# Patient Record
Sex: MALE | Race: WHITE | NOT HISPANIC OR LATINO | Employment: FULL TIME | URBAN - METROPOLITAN AREA
[De-identification: names, ages, dates, MRNs, and addresses within clinical notes are randomized per-mention and may not be internally consistent; named-entity substitution may affect disease eponyms.]

---
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
## 2019-11-25 LAB — RAPID GROUP A STREP (OHS): NEGATIVE

## 2020-09-25 ENCOUNTER — HISTORICAL (OUTPATIENT)
Dept: ADMINISTRATIVE | Facility: HOSPITAL | Age: 20
End: 2020-09-25

## 2020-09-25 LAB
ABS NEUT (OLG): 3.4 X10(3)/MCL (ref 2.1–9.2)
ALBUMIN SERPL-MCNC: 4.4 GM/DL (ref 3.4–5)
ALBUMIN/GLOB SERPL: 1.83 {RATIO} (ref 1.5–2.5)
ALP SERPL-CCNC: 59 UNIT/L (ref 38–126)
ALT SERPL-CCNC: 22 UNIT/L (ref 7–52)
APPEARANCE, UA: CLEAR
AST SERPL-CCNC: 19 UNIT/L (ref 15–37)
BACTERIA #/AREA URNS AUTO: ABNORMAL /HPF
BILIRUB SERPL-MCNC: 0.4 MG/DL (ref 0.2–1)
BILIRUB UR QL STRIP: NEGATIVE MG/DL
BILIRUBIN DIRECT+TOT PNL SERPL-MCNC: 0.1 MG/DL (ref 0–0.5)
BILIRUBIN DIRECT+TOT PNL SERPL-MCNC: 0.3 MG/DL
BUN SERPL-MCNC: 15 MG/DL (ref 7–18)
CALCIUM SERPL-MCNC: 9.7 MG/DL (ref 8.5–10.1)
CHLORIDE SERPL-SCNC: 104 MMOL/L (ref 98–107)
CHOLEST SERPL-MCNC: 163 MG/DL (ref 0–200)
CHOLEST/HDLC SERPL: 3.7 {RATIO}
CO2 SERPL-SCNC: 32 MMOL/L (ref 21–32)
COLOR UR: YELLOW
CREAT SERPL-MCNC: 0.95 MG/DL (ref 0.6–1.3)
ERYTHROCYTE [DISTWIDTH] IN BLOOD BY AUTOMATED COUNT: 12.2 % (ref 11.5–17)
GLOBULIN SER-MCNC: 2.4 GM/DL (ref 1.2–3)
GLUCOSE (UA): NEGATIVE MG/DL
GLUCOSE SERPL-MCNC: 87 MG/DL (ref 74–106)
HCT VFR BLD AUTO: 47.1 % (ref 42–52)
HDLC SERPL-MCNC: 44 MG/DL (ref 35–60)
HGB BLD-MCNC: 15.7 GM/DL (ref 14–18)
HGB UR QL STRIP: NEGATIVE UNIT/L
KETONES UR QL STRIP: NEGATIVE MG/DL
LDLC SERPL CALC-MCNC: 115 MG/DL (ref 0–129)
LEUKOCYTE ESTERASE UR QL STRIP: NEGATIVE UNIT/L
LYMPHOCYTES # BLD AUTO: 2 X10(3)/MCL (ref 0.6–3.4)
LYMPHOCYTES NFR BLD AUTO: 31.3 % (ref 13–40)
MCH RBC QN AUTO: 29.3 PG (ref 27–31.2)
MCHC RBC AUTO-ENTMCNC: 33 GM/DL (ref 32–36)
MCV RBC AUTO: 88 FL (ref 80–94)
MONOCYTES # BLD AUTO: 0.9 X10(3)/MCL (ref 0.1–1.3)
MONOCYTES NFR BLD AUTO: 14.1 % (ref 0.1–24)
NEUTROPHILS NFR BLD AUTO: 54.6 % (ref 47–80)
NITRITE UR QL STRIP.AUTO: NEGATIVE
PH UR STRIP: 6 [PH]
PLATELET # BLD AUTO: 216 X10(3)/MCL (ref 130–400)
PMV BLD AUTO: 9.3 FL (ref 9.4–12.4)
POTASSIUM SERPL-SCNC: 4.2 MMOL/L (ref 3.5–5.1)
PROT SERPL-MCNC: 6.8 GM/DL (ref 6.4–8.2)
PROT UR QL STRIP: NEGATIVE MG/DL
RBC # BLD AUTO: 5.35 X10(6)/MCL (ref 4.7–6.1)
RBC #/AREA URNS HPF: ABNORMAL /HPF
SODIUM SERPL-SCNC: 141 MMOL/L (ref 136–145)
SP GR UR STRIP: >1.03
SQUAMOUS EPITHELIAL, UA: ABNORMAL /LPF
TRIGL SERPL-MCNC: 61 MG/DL (ref 30–150)
UROBILINOGEN UR STRIP-ACNC: 0.2 MG/DL
VLDLC SERPL CALC-MCNC: 12.2 MG/DL
WBC # SPEC AUTO: 6.3 X10(3)/MCL (ref 4.5–11.5)
WBC #/AREA URNS AUTO: ABNORMAL /[HPF]

## 2020-11-12 LAB — RAPID GROUP A STREP (OHS): NEGATIVE

## 2021-02-02 ENCOUNTER — HISTORICAL (OUTPATIENT)
Dept: ADMINISTRATIVE | Facility: HOSPITAL | Age: 21
End: 2021-02-02

## 2021-02-02 LAB — SARS-COV-2 RNA RESP QL NAA+PROBE: NEGATIVE

## 2021-02-04 LAB — FINAL CULTURE: NORMAL

## 2022-04-10 ENCOUNTER — HISTORICAL (OUTPATIENT)
Dept: ADMINISTRATIVE | Facility: HOSPITAL | Age: 22
End: 2022-04-10

## 2022-04-26 VITALS
DIASTOLIC BLOOD PRESSURE: 60 MMHG | OXYGEN SATURATION: 97 % | BODY MASS INDEX: 23.93 KG/M2 | WEIGHT: 157.88 LBS | SYSTOLIC BLOOD PRESSURE: 100 MMHG | HEIGHT: 68 IN

## 2022-05-03 NOTE — HISTORICAL [facility]
This is a historical note converted from Meghan. Formatting and pictures may have been removed.  Please reference Meghan for original formatting and attached multimedia. Chief Complaint  ANNUAL WELLNESS-NON-FASTING  History of Present Illness  Patient presents for wellness examination.  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  He is sleeping a lot better.  Appetite has been okay.  He fences at times. He is otherwise inactive.  No tobacco or alcohol.  He has an occasional soda or cup of coffee.  ?  Review of Systems  Constitutional:??no?weight gain,??no?weight loss,??no?fatigue, ?no?fever, ?no?chills, ?no?weakness, ?no?trouble sleeping  Eyes: ?no?vision loss/changes,??+?glasses,??no?pain,??no?redness,??no?blurry or double vision,??no?flashing lights,??no?glaucoma,??no?cataracts  Last eye exam:?has been a while  Neck: ?no?lymphadenopathy,??no?thyroid abnormalities,??no?bruits,??no?stiffness  Ears:??no?decreased hearing,??no?tinnitus,??no?earache,??no?drainage?  Nose:??no?congestion,??no?rhinorrhea,??no?epistaxis,??no?sinus pressure  Throat/Oral:??no?sore throat,??no?hoarseness, ?no?dental caries,??no?gum bleeding,??no?oral lesions  Cardiovascular:??no?chest pain, palpitations, or tightness,??+?dyspnea with exertion: deconditioning,??no?orthopnea,??no?paroxysmal nocturnal dyspnea  Respiratory:??no?cough,??no?sputum,??no?hemoptysis,??no?dyspnea,??no?wheezing,??no?pleuritic chest pain?  Gastrointestinal:??no?abdominal pain,??no?nausea,??no?vomiting,??+?heartburn: infrequent,??no?dysphagia or odynophagia,??no?diarrhea,??no?constipation,??no?melena,??no?hematochezia,?no?jaundice  Urinary:??no?frequency,??no?urgency,??no?burning or pain,?no?hematuria,??no?incontinence,??no?hesitancy,??no?incomplete voiding,??no?flank pain,??no?nocturia  Musculoskeletal:?no?myalgias,??+?arthralgias: left knee, due to being on his feet a lot,?no?neck pain,??no?back pain,??no?swelling of  extremities  Skin:?no?rashes,??no?sores,??no?non-healing wounds  Neurologic:??+?headaches: occasional migraines,?takes Excedrin which usually helps, ?no?dizziness/lightheadedness,??no?tremors,??no?paresthesias,??no?seizures,??no?muscle weakness  Psychiatric:??+?depression/sadness,??no?anhedonia,??no?irritability,??no?suicidal ideations,??no?anxiety,??no?panic attacks  Endocrine:??no?hot or cold intolerance,??no?sweating,??no?polyuria,??no?polydipsia,??no?polyphagia  Hematologic:??no?bruising,??no?bleeding disorders?  Physical Exam  Vitals & Measurements  T:?36.9? ?C (Oral)? HR:?80(Peripheral)? BP:?106/72? SpO2:?97%?  HT:?172.72?cm? HT:?172.72?cm? WT:?72.900?kg? WT:?72.9?kg? BMI:?24.44?  ?  GENERAL: The patient is a well-developed, well-nourished young white?male in no?apparent distress. He is alert and oriented x 4.  HEENT: Head is normocephalic and atraumatic. Extraocular muscles are intact. Pupils are equal, round, and reactive to light and accommodation. Nares appeared normal. Mouth is well hydrated and without lesions. Mucous membranes are moist. Posterior pharynx clear of any exudate or lesions.  NECK: Supple. No carotid bruits.? No lymphadenopathy or thyromegaly.  LUNGS: Clear to auscultation.  HEART: Regular rate and rhythm without murmur, gallops or rubs.  ABDOMEN: Soft, nontender, and nondistended.? Positive bowel sounds.? No hepatosplenomegaly was noted.  EXTREMITIES: Without any cyanosis, clubbing, rash, lesions or edema.? Left knee:?No tenderness to palpation, no swelling,?good flexion and extension.  NEUROLOGIC: Cranial nerves II through XII are grossly intact.? No motor or sensory deficits.? Cerebellar function intact.  SKIN: No ulceration or induration present.  :? Normal male genitalia, no hernias,??NST,??prostate soft, smooth and without nodules.  ?  Assessment/Plan  1.?Wellness examination?Z00.00  Patient presents for wellness examination.  Quadrivalent flu vaccine 0.5 IM administered.  Patient  encouraged to be more physically active and exercise.  Labs pending.  Ordered:  CBC w/ Auto Diff, Routine collect, 09/25/20 9:04:00 CDT, Blood, Order for future visit, Stop date 09/25/20 9:04:00 CDT, Lab Collect, Wellness examination, 09/25/20 9:04:00 CDT  Clinic Follow-Up Wellness, *Est. 09/25/21 3:00:00 CDT, Order for future visit, Wellness examination, [facility]  Comprehensive Metabolic Panel, Routine collect, 09/25/20 9:04:00 CDT, Blood, Order for future visit, Stop date 09/25/20 9:04:00 CDT, Lab Collect, Wellness examination, 09/25/20 9:04:00 CDT  Lab Collection Request, 09/25/20 9:04:00 CDT, [facility] - [facility], 09/25/20 9:04:00 CDT, Wellness examination  Lipid Panel, Routine collect, 09/25/20 9:04:00 CDT, Blood, Order for future visit, Stop date 09/25/20 9:04:00 CDT, Lab Collect, Wellness examination, 09/25/20 9:04:00 CDT  Preventative Health Care Est 18-39 years [id number] PC, Wellness examination  Depression  Immunization due, [facility] - [facility], 09/25/20 9:05:00 CDT  Urinalysis no Reflex, Routine collect, Urine, Order for future visit, 09/25/20 9:04:00 CDT, Stop date 09/25/20 9:04:00 CDT, Nurse collect, Wellness examination  ?  2.?Depression?F32.9  Has been well controlled.  Will discontinue citalopram due to sexual side effects.  Trial of Wellbutrin  mg: Take 1?daily.? He is to let us know how he is doing in 3 to 4 weeks.  Ordered:  Preventative Health Care Est 18-39 years [id number] PC, Wellness examination  Depression  Immunization due, [facility] - [facility], 09/25/20 9:05:00 CDT  ?  3.?Immunization due?Z23  Quadrivalent flu vaccine 0.5 IM administered; benefits/potential risks/side effects discussed with patient.  Ordered:  Influenza Virus Vaccine, Inactivated, 0.5 mL, IM, Once-Unscheduled, first dose 09/25/20 8:59:00 CDT  Preventative Health Care Est 18-39 years [id number] PC, Wellness examination  Depression  Immunization due, [facility] - [facility], 09/25/20 9:05:00 CDT  ?  Orders:  buPROPion, 150 mg = 1 tab(s), Oral,  q24hr, # 30 tab(s), 1 Refill(s), Pharmacy: [facility]/pharmacy #[id number], 172.72, cm, Height/Length Dosing, 09/25/20 8:38:00 CDT, 72.9, kg, Weight Dosing, 09/25/20 8:38:00 CDT  Referrals  Clinic Follow-Up Wellness, *Est. 09/25/21 3:00:00 CDT, Order for future visit, Wellness examination, [facility]   Problem List/Past Medical History  Ongoing  Depression  Insomnia  Historical  Low back strain  Needs flu shot  Procedure/Surgical History  [place] teeth extracted (2017)  nasal fx repair (2005)   Medications  Adult Quadrivalent Influenza Vaccine, 0.5 mL, IM, Once-Unscheduled  Wellbutrin  mg/24 hours oral tablet, extended release, 150 mg= 1 tab(s), Oral, q24hr, 1 refills  Allergies  penicillins?(Rash)  Social History  Abuse/Neglect  No, 09/25/2020  No, 08/14/2020  Alcohol  Never, 09/25/2020  Never, 08/01/2018  Employment/School  Employed, Work/School description: [employer]., 09/25/2020  Unemployed, 08/01/2018  Exercise  Home/Environment  Lives with Father, Mother. Living situation: Home/Independent., 09/25/2020  Living situation: Home/Independent., 08/01/2018  Nutrition/Health  Regular, Good, 09/25/2020  Regular, Caffeine intake amount: 1 SODA PER DAY., 08/01/2018  Substance Use  Never, 09/25/2020  Never, 08/01/2018  Tobacco  Never (less than 100 in lifetime), N/A, 09/25/2020  Never (less than 100 in lifetime), No, 08/14/2020  Family History  Asthma.: Father.  Cardiovascular disease: Father.  Depression: Brother.  Diabetes mellitus type 2: Mother.  Hypertension.: Father.  Impaired glucose tolerance: Father.  Immunizations  Vaccine Date Status   influenza virus vaccine, inactivated 11/21/2018 Given   varicella virus vaccine 07/20/2011 Recorded   tetanus/diphtheria/pertussis, acel(Tdap) 07/20/2011 Recorded   meningococcal conjugate vaccine 07/20/2011 Recorded   poliovirus vaccine, inactivated 01/31/2005 Recorded   measles/mumps/rubella virus vaccine 01/31/2005 Recorded   diphtheria/pertussis, acel/tetanus ped 01/31/2005  Recorded   varicella virus vaccine 04/25/2001 Recorded   diphtheria/pertussis, acel/tetanus ped 04/25/2001 Recorded   measles/mumps/rubella virus vaccine 12/13/2000 Recorded   poliovirus vaccine, inactivated 10/13/2000 Recorded   hepatitis B pediatric vaccine 10/13/2000 Recorded   diphtheria/pertussis, acel/tetanus ped 04/15/2000 Recorded   diphtheria/pertussis, acel/tetanus ped 02/14/2000 Recorded   poliovirus vaccine, inactivated 02/11/2000 Recorded   hepatitis B pediatric vaccine 02/11/2000 Recorded   poliovirus vaccine, inactivated 1999 Recorded   diphtheria/pertussis, acel/tetanus ped 1999 Recorded   hepatitis B pediatric vaccine 1999 Recorded   Health Maintenance  Health Maintenance  ???Pending?(in the next year)  ??? ??OverDue  ??? ? ? ?Alcohol Misuse Screening due??01/02/20??and every 1??year(s)  ??? ??Due?  ??? ? ? ?ADL Screening due??09/25/20??and every 1??year(s)  ??? ? ? ?Influenza Vaccine due??09/25/20??and every?  ??? ??Due In Future?  ??? ? ? ?Obesity Screening not due until??01/01/21??and every 1??year(s)  ??? ? ? ?Tetanus Vaccine not due until??07/20/21??and every 10??year(s)  ???Satisfied?(in the past 1 year)  ??? ??Satisfied?  ??? ? ? ?Blood Pressure Screening on??09/25/20.??Satisfied by Sherif Light LPN  ??? ? ? ?Body Mass Index Check on??09/25/20.??Satisfied by Sherif Light LPN  ??? ? ? ?Influenza Vaccine on??09/25/20.??Satisfied by Han Camargo MD  ??? ? ? ?Obesity Screening on??09/25/20.??Satisfied by Sherif Light LPN  ?

## 2022-06-14 PROBLEM — Z13.31 POSITIVE DEPRESSION SCREENING: Status: ACTIVE | Noted: 2022-06-14

## 2022-09-21 ENCOUNTER — HISTORICAL (OUTPATIENT)
Dept: ADMINISTRATIVE | Facility: HOSPITAL | Age: 22
End: 2022-09-21

## 2022-10-04 ENCOUNTER — OFFICE VISIT (OUTPATIENT)
Dept: URGENT CARE | Facility: CLINIC | Age: 22
End: 2022-10-04
Payer: COMMERCIAL

## 2022-10-04 VITALS
OXYGEN SATURATION: 100 % | HEIGHT: 68 IN | SYSTOLIC BLOOD PRESSURE: 167 MMHG | BODY MASS INDEX: 22.99 KG/M2 | TEMPERATURE: 99 F | RESPIRATION RATE: 18 BRPM | DIASTOLIC BLOOD PRESSURE: 115 MMHG | WEIGHT: 151.69 LBS | HEART RATE: 62 BPM

## 2022-10-04 DIAGNOSIS — R10.30 LOWER ABDOMINAL PAIN: Primary | ICD-10-CM

## 2022-10-04 DIAGNOSIS — K59.00 CONSTIPATION, UNSPECIFIED CONSTIPATION TYPE: ICD-10-CM

## 2022-10-04 LAB
BILIRUB UR QL STRIP: NEGATIVE
GLUCOSE UR QL STRIP: NEGATIVE
KETONES UR QL STRIP: POSITIVE
LEUKOCYTE ESTERASE UR QL STRIP: NEGATIVE
PH, POC UA: 6
POC BLOOD, URINE: NEGATIVE
POC NITRATES, URINE: NEGATIVE
PROT UR QL STRIP: NEGATIVE
SP GR UR STRIP: 1.03 (ref 1–1.03)
UROBILINOGEN UR STRIP-ACNC: 0.2 (ref 0.3–2.2)

## 2022-10-04 PROCEDURE — 99214 OFFICE O/P EST MOD 30 MIN: CPT | Mod: S$PBB,,, | Performed by: FAMILY MEDICINE

## 2022-10-04 PROCEDURE — 81003 URINALYSIS AUTO W/O SCOPE: CPT | Mod: PBBFAC | Performed by: FAMILY MEDICINE

## 2022-10-04 PROCEDURE — 99214 PR OFFICE/OUTPT VISIT, EST, LEVL IV, 30-39 MIN: ICD-10-PCS | Mod: S$PBB,,, | Performed by: FAMILY MEDICINE

## 2022-10-04 PROCEDURE — 99214 OFFICE O/P EST MOD 30 MIN: CPT | Mod: PBBFAC | Performed by: FAMILY MEDICINE

## 2022-10-04 RX ORDER — POLYETHYLENE GLYCOL 3350 17 G/17G
17 POWDER, FOR SOLUTION ORAL DAILY
Qty: 235 G | Refills: 2 | Status: SHIPPED | OUTPATIENT
Start: 2022-10-04 | End: 2022-10-27

## 2022-10-04 RX ORDER — SYRING-NEEDL,DISP,INSUL,0.3 ML 29 G X1/2"
296 SYRINGE, EMPTY DISPOSABLE MISCELLANEOUS ONCE
Qty: 295 ML | Refills: 2 | Status: SHIPPED | OUTPATIENT
Start: 2022-10-04 | End: 2022-10-04

## 2022-10-04 NOTE — LETTER
October 4, 2022      [facility]  [address] [address]  Phone: [phone]       Patient: Richie Fernández   YOB: 2000  Date of Visit: 10/04/2022    To Whom It May Concern:    Leonides Fernández  was at [facility] on 10/04/2022. The patient may return to work/school on OCT 7 2022 with no restrictions. If you have any questions or concerns, or if I can be of further assistance, please do not hesitate to contact me.    Sincerely,    BECKY HILL MD

## 2022-10-04 NOTE — PROGRESS NOTES
"Subjective:       Patient ID: Richie Fernández is a 21 y.o. male.    Chief Complaint: Abdominal Pain (Lower x today) and Constipation      HPI  22 yo male with abdominal pain and constipation.  Pain started in his lower abdomen today.  Stool floats and has a "mossy" appearance.  No new foods or medications.  Denies narcotic  use, melena, or PRBPR.     Review of Systems   Gastrointestinal:         As above       Objective:       Vital Signs  Temp: 99.1 °F (37.3 °C)  Pulse: 62  Resp: 18  SpO2: 100 %  BP: (!) 167/115  Pain Score:   7  Pain Loc: Abdomen  Height and Weight  Height: 5' 8.11" (173 cm)  Weight: 68.8 kg (151 lb 10.8 oz)  BSA (Calculated - sq m): 1.82 sq meters  BMI (Calculated): 23  Weight in (lb) to have BMI = 25: 164.6]  Physical Exam  Vitals reviewed.   Constitutional:       Appearance: Normal appearance.   HENT:      Head: Normocephalic and atraumatic.   Eyes:      Extraocular Movements: Extraocular movements intact.      Conjunctiva/sclera: Conjunctivae normal.   Cardiovascular:      Rate and Rhythm: Regular rhythm.      Heart sounds: Normal heart sounds.   Pulmonary:      Breath sounds: Normal breath sounds.   Abdominal:      General: Bowel sounds are normal.      Comments: Mild diffuse TTP.  Tympany in upper quadrants, dullness in lower quadrants.     Skin:     General: Skin is dry.   Neurological:      General: No focal deficit present.      Mental Status: He is alert.   Psychiatric:         Mood and Affect: Mood normal.         Behavior: Behavior normal.       Assessment:       Problem List Items Addressed This Visit    None  Visit Diagnoses       Lower abdominal pain    -  Primary    Relevant Orders    POCT Urinalysis, Dipstick, Automated, W/O Scope (Completed)    Constipation, unspecified constipation type        Relevant Medications    polyethylene glycol (GLYCOLAX) 17 gram/dose powder    magnesium citrate solution              Plan:           Encouraged increased fluid and fiber intake  ER " precautions  FU with PCP ASAP

## 2022-10-27 PROBLEM — Z23 IMMUNIZATION DUE: Status: ACTIVE | Noted: 2022-10-27

## 2022-12-06 PROBLEM — U07.1 COVID-19: Status: ACTIVE | Noted: 2022-12-06

## 2023-02-07 PROBLEM — G43.009 MIGRAINE WITHOUT AURA AND WITHOUT STATUS MIGRAINOSUS, NOT INTRACTABLE: Status: ACTIVE | Noted: 2023-02-07
